# Patient Record
Sex: FEMALE | NOT HISPANIC OR LATINO | URBAN - METROPOLITAN AREA
[De-identification: names, ages, dates, MRNs, and addresses within clinical notes are randomized per-mention and may not be internally consistent; named-entity substitution may affect disease eponyms.]

---

## 2022-05-10 ENCOUNTER — TRANSFERRED RECORDS (OUTPATIENT)
Dept: HEALTH INFORMATION MANAGEMENT | Facility: CLINIC | Age: 52
End: 2022-05-10
Payer: COMMERCIAL

## 2022-05-16 ENCOUNTER — MEDICAL CORRESPONDENCE (OUTPATIENT)
Dept: HEALTH INFORMATION MANAGEMENT | Facility: CLINIC | Age: 52
End: 2022-05-16
Payer: COMMERCIAL

## 2022-05-16 ENCOUNTER — TRANSFERRED RECORDS (OUTPATIENT)
Dept: HEALTH INFORMATION MANAGEMENT | Facility: CLINIC | Age: 52
End: 2022-05-16
Payer: COMMERCIAL

## 2022-05-18 ENCOUNTER — TRANSCRIBE ORDERS (OUTPATIENT)
Dept: OTHER | Age: 52
End: 2022-05-18
Payer: COMMERCIAL

## 2022-05-18 DIAGNOSIS — R22.0 LOCALIZED SWELLING, MASS AND LUMP, HEAD: ICD-10-CM

## 2022-05-18 DIAGNOSIS — K11.5 SIALOLITHIASIS: Primary | ICD-10-CM

## 2022-05-25 ENCOUNTER — TELEPHONE (OUTPATIENT)
Dept: OTOLARYNGOLOGY | Facility: CLINIC | Age: 52
End: 2022-05-25
Payer: COMMERCIAL

## 2022-05-25 NOTE — TELEPHONE ENCOUNTER
[facility] Call Center    Phone Message    May a detailed message be left on voicemail: no     Reason for Call: Appointment Intake    Referring Provider Name: CARLOS EDUARDO RECIO  Diagnosis and/or Symptoms: K11.5 (ICD-10-CM) - Sialolithiasis  R22.0 (ICD-10-CM) - Localized swelling, mass and lump, head      Sending to clinic for review due to mass.      Action Taken: Other: ENT    Travel Screening: Not Applicable

## 2022-05-26 NOTE — TELEPHONE ENCOUNTER
Patient wants a call back the morning of Friday 5/27 to schedule New appt with Dr. Childs.      Appt Note- Ref by Aubrey Dailey for Sialolithiasis, Localized Swelling, Mass and Lump, Head

## 2022-06-10 NOTE — TELEPHONE ENCOUNTER
FUTURE VISIT INFORMATION      FUTURE VISIT INFORMATION:    Date: 8/2/22    Time: 1PM    [place] [facility] [place] INFORMATION:    Referring provider:  Aubrey Dailey    Referring providers clinic:  [facility] ENT    Reason for visit/diagnosis  Sialolithiasis, Localized swelling, mass and lump, head    RECORDS REQUESTED FROM:       Clinic name Comments Records Status Imaging Status   [facility] ENT 5/16/22 note from Dr Dailey Scanned in EPIC    [facility] Family Medicine   3/14/22 note from Linwood Littlejohn III, MD   Care everywhere    [facility] Speech Therapy   10/8/19 note from Tammy Rodas M.S. CCC-SLP   Care everywhere     [facility]   [address] [place] [address] [phone]  5/10/22 MRI NECK   5/10/22 CT Posterior Fossa Ear   5/14/21 MRI Head   7/8/19 US Thyroid   Care everywhere  Req 6/29/22 - PACS                 6/29/22 11:24AM sent a fax to [facility] for images - [facility]ay   7/6/22 3:26PM images received in PACS - Amay

## 2022-08-02 ENCOUNTER — TELEPHONE (OUTPATIENT)
Dept: OTOLARYNGOLOGY | Facility: CLINIC | Age: 52
End: 2022-08-02

## 2022-08-02 ENCOUNTER — PRE VISIT (OUTPATIENT)
Dept: OTOLARYNGOLOGY | Facility: CLINIC | Age: 52
End: 2022-08-02
Payer: COMMERCIAL

## 2022-08-02 ENCOUNTER — OFFICE VISIT (OUTPATIENT)
Dept: OTOLARYNGOLOGY | Facility: CLINIC | Age: 52
End: 2022-08-02
Payer: COMMERCIAL

## 2022-08-02 VITALS
TEMPERATURE: 97.1 F | BODY MASS INDEX: 28.88 KG/M2 | HEART RATE: 56 BPM | HEIGHT: 71 IN | SYSTOLIC BLOOD PRESSURE: 115 MMHG | DIASTOLIC BLOOD PRESSURE: 76 MMHG | WEIGHT: 206.3 LBS

## 2022-08-02 DIAGNOSIS — R22.0 FACIAL SWELLING: Primary | ICD-10-CM

## 2022-08-02 PROCEDURE — 99203 OFFICE O/P NEW LOW 30 MIN: CPT | Performed by: OTOLARYNGOLOGY

## 2022-08-02 ASSESSMENT — PAIN SCALES - GENERAL: PAINLEVEL: NO PAIN (1)

## 2022-08-02 NOTE — PATIENT INSTRUCTIONS
"You were seen in the clinic today by Dr. Childs. If you have any questions or concerns after your appointment, please call the clinic at [phone]. Press \"1\" for scheduling, press \"3\" for nurse advice.    2.   The following has been recommended for you based upon your appointment today:   -Please obtain CT scan    3.   Plan to return the clinic after results.       Maribel Don LPN  [facility]  Department of Otolaryngology  [phone]       If you have a (cerebrospinal fluid) CSF leak, you should know:  You may be at risk for an infection called pneumococcal meningitis.  Shots (vaccinations) can help prevent this infection.  When someone does get the infection, early treatment can help.    What is pneumococcal meningitis?  Meningitis is an infection of the fluid around the brain. It's very dangerous and can cause brain damage and even death. There are 2 kinds of meningitis, viral and bacterial. Pneumococcal meningitis is a bacterial infection. We have vaccines to help protect against it.    Please note: the meningococcal vaccine often given to teens and college students does not protect against pneumococcal meningitis.    Why should I worry about it?  People who have a CSF leak have more risk for this infection than other people.    What should I do?  If you have a CSF leak, talk with your primary care provider about the vaccines and make sure your shots are up to date.  If you have these symptoms, see a doctor immediately:  Fever and chills  Stiff neck together with a headache  Sick stomach or throwing up (nausea or vomiting)    When should I get the vaccines?  Your age and shot history affect which vaccine you should get and when. Talk to your provider. The vaccine guidelines that we follow are from the Centers for Disease Control (CDC).     Vaccine guidelines for adults with CSF leak:  If you have never had a pneumococcal vaccine, you should get the PCV20 (Hjqwjya93) or PCV15 (Vaxneuvance).   If you get " the PCV20, you do not need any other vaccines.   If you get the PCV15, you will need a second vaccine (the Pneumovax 23) at least 8 weeks later.   If you have previously received a dose of the Pneumovax 23:   You should get 1 dose of the PCV20 (Kcyqbtv83) or PCV15 (Vaxneuvance) at least 1 year after receiving the Pneumovax 23.   If you have previously received a dose of the Esxvjfp00:   You should get 1 dose of the Rqmdeafem96 at least 8 weeks after receiving the Mqahsch01.   If you have previously received both the Ajmzrrorn34 and Eyikpls76:   You do not need any other vaccines. Together, Jduhzbo55 and Jomdxnnbs61 meet the meningitis vaccine guidelines for CSF leak.       To learn more:  Talk to your provider. You can also visit these CDC websites:  https://www.cdc.gov/vaccines/vpd/pneumo/public/index.html  https://www.cdc.gov/vaccines/vpd/pneumo/hcp/mva-mdzf-ot-vaccinate.html

## 2022-08-02 NOTE — PROGRESS NOTES
HISTORY OF PRESENT ILLNESS: Crystal De La Cruz is a 52 year old female with a history of    Last 2 Scores for Patient-Answered VHI Questionnaire  No flowsheet data found.    Last 2 Scores for Patient-Answered CSI Questionnaire  No flowsheet data found.      Last 2 Scores for Patient-Answered EAT Questionnaire  No flowsheet data found.    August 2, 2022      Aubrey Dailey M.D.  [facility]  [address] [address]      Dear Dr Dailey,    Thank you for the opportunity to see Ms. Crystal De La Cruz today in clinic.    HISTORY OF PRESENT ILLNESS:  Ms. Crystal De La Cruz is 52 years of age.  She is self-referred, but saw one of the otolaryngology groups in [place].  She has a series a separate problems that are somewhat unusual and somewhat concerning as well.  She has had some problems with her left ear with left ear pain and left ear like plugging-type sensation a bit, but she was noted to have left ear drainage and it was beta-2 transferrin positive.  She has had no other ear history or craniofacial trauma history or anything else of this nature.  Next, she will have pain along the ear canal on the left side that may be more anteriorly.  She also was noted to have a very localized parotitis and MRI scan shows her to have accessory parotid tissue on the left side as well.      PHYSICAL EXAMINATION:  We examined her today and in examining her completely, the ear canal looked completely clear, but she had TMJ pain.  This was elucidated when I felt inside her ear canal on the left side.  Her face was otherwise symmetric.  Oral cavity and oropharynx is clear.  Nasal cavity was clear as well.  Neck was without any mass or adenopathy.    ASSESSMENT AND PLAN:  The patient with a history three separate unusual issues with the beta-2 transferrin positive fluid in her ear.  I am going to have to send her to the [facility].  She already had posterior fossa CT scan.   She also has to get a parotid scan.  The parotid MRI scan shows accessory tissue.  She may very well have a stone or some other kind of unusual formation of the glandular tissue on that side because the duct is dilated on the left side.  We will want to get a parotid CT scan on her as well.  This should probably be done at a [place] facility like [facility] or something like that.   She might need to have a sialoendoscopy or something similar to a lot this.  We will see her again after CT scan is done of the parotid gland.  She could have a phone visit with her in the next five weeks or so, or when she is back next to see Dr. Merly Sanchez, she could see me too.    Thank you for allowing me to participate in the care of this patient. If you have any further questions, please do not hesitate to contact me.      Sincerely,      Jose Childs M.D., Ph.D., FACS   Director, Research & Clinical Studies      Otolaryngology-Head and Neck Surgery    [facility]         PAST MEDICAL HISTORY: No past medical history on file.    PAST SURGICAL HISTORY: No past surgical history on file.    FAMILY HISTORY: No family history on file.    SOCIAL HISTORY:   Social History     Tobacco Use     Smoking status: Not on file     Smokeless tobacco: Not on file   Substance Use Topics     Alcohol use: Not on file       REVIEW OF SYSTEMS: Ten point review of systems was performed and is negative except for: No flowsheet data found.     ALLERGIES: Patient has no known allergies.    MEDICATIONS:   No current outpatient medications on file.         PHYSICAL EXAMINATION:  She  is awake, alert and in no apparent distress.    Her tympanic membranes are clear and intact bilaterally. External auditory canals are clear.  Nasal exam shows a mild septal deviation without obstruction.  Examination of the oral cavity shows no suspicious lesions.  There is symmetric movement of the tongue and soft palate.    The  oropharynx is clear.  Her neck is supple without significant adenopathy.  Pulse is regular.  Upper airway is clear.  Cranial nerves II-XII are grossly intact.      IMPRESSION/PLAN:

## 2022-08-02 NOTE — LETTER
8/2/2022       RE: Crystal De La Cruz  [address]  [facility] [address]     Dear Colleague,    Thank you for referring your patient, Crystal De La Cruz, to the [facility] EAR NOSE AND THROAT CLINIC [place] at [facility]. Please see a copy of my visit note below.        August 2, 2022      Aubrey Dailey M.D.  [facility]  [address], [place]  [address]      Dear Dr Dailey,    Thank you for the opportunity to see Ms. Crystal De La Cruz today in clinic.    HISTORY OF PRESENT ILLNESS:  Ms. Crystal De La Cruz is 52 years of age.  She is self-referred, but saw one of the otolaryngology groups in [place].  She has a series a separate problems that are somewhat unusual and somewhat concerning as well.  She has had some problems with her left ear with left ear pain and left ear like plugging-type sensation a bit, but she was noted to have left ear drainage and it was beta-2 transferrin positive.  She has had no other ear history or craniofacial trauma history or anything else of this nature.  Next, she will have pain along the ear canal on the left side that may be more anteriorly.  She also was noted to have a very localized parotitis and MRI scan shows her to have accessory parotid tissue on the left side as well.      PHYSICAL EXAMINATION:  We examined her today and in examining her completely, the ear canal looked completely clear, but she had TMJ pain.  This was elucidated when I felt inside her ear canal on the left side.  Her face was otherwise symmetric.  Oral cavity and oropharynx is clear.  Nasal cavity was clear as well.  Neck was without any mass or adenopathy.    ASSESSMENT AND PLAN:  The patient with a history three separate unusual issues with the beta-2 transferrin positive fluid in her ear.  I am going to have to send her to the [facility].  She already had posterior fossa CT scan.  She also has  to get a parotid scan.  The parotid MRI scan shows accessory tissue.  She may very well have a stone or some other kind of unusual formation of the glandular tissue on that side because the duct is dilated on the left side.  We will want to get a parotid CT scan on her as well.  This should probably be done at a [place] facility like [facility] or something like that.   She might need to have a sialoendoscopy or something similar to a lot this.  We will see her again after CT scan is done of the parotid gland.  She could have a phone visit with her in the next five weeks or so, or when she is back next to see Dr. Merly Sanchez, she could see me too.    Thank you for allowing me to participate in the care of this patient. If you have any further questions, please do not hesitate to contact me.      Sincerely,      Jose Childs M.D., Ph.D., FACS   Director, Research & Clinical Studies      Otolaryngology-Head and Neck Surgery    [facility]         PAST MEDICAL HISTORY: No past medical history on file.    PAST SURGICAL HISTORY: No past surgical history on file.    FAMILY HISTORY: No family history on file.    SOCIAL HISTORY:   Social History     Tobacco Use     Smoking status: Not on file     Smokeless tobacco: Not on file   Substance Use Topics     Alcohol use: Not on file       REVIEW OF SYSTEMS: Ten point review of systems was performed and is negative except for: No flowsheet data found.     ALLERGIES: Patient has no known allergies.    MEDICATIONS:   No current outpatient medications on file.         PHYSICAL EXAMINATION:  She  is awake, alert and in no apparent distress.    Her tympanic membranes are clear and intact bilaterally. External auditory canals are clear.  Nasal exam shows a mild septal deviation without obstruction.  Examination of the oral cavity shows no suspicious lesions.  There is symmetric movement of the tongue and soft palate.    The oropharynx  is clear.  Her neck is supple without significant adenopathy.  Pulse is regular.  Upper airway is clear.  Cranial nerves II-XII are grossly intact.      IMPRESSION/PLAN:          Again, thank you for allowing me to participate in the care of your patient.      Sincerely,    Jose Childs MD

## 2022-08-16 ENCOUNTER — TELEPHONE (OUTPATIENT)
Dept: NEUROSURGERY | Facility: CLINIC | Age: 52
End: 2022-08-16

## 2022-08-16 NOTE — TELEPHONE ENCOUNTER
Called patient to discuss referral to skull base clinic.    Dr. Sanchez reviewed patient's imaging and does not see any skull base defect or fluid in the mastoid to indicate encephalocele or CSF leak. It's possible that the beta 2 transferrin was a false positive.     Recommend that patient follow up with Dr. Dailey who was following her for the left sided otorrhea, and also complete the CT scan and follow up with Dr. Childs as planned.     Discussed no need for skull base clinic visit at this time. Pt is agreeable, and asks for help with scheduling CT and follow up with Dr. Childs. Message routed to clinic schedulers to assist her with this.

## 2022-08-30 ENCOUNTER — ANCILLARY PROCEDURE (OUTPATIENT)
Dept: CT IMAGING | Facility: CLINIC | Age: 52
End: 2022-08-30
Attending: OTOLARYNGOLOGY
Payer: COMMERCIAL

## 2022-08-30 ENCOUNTER — OFFICE VISIT (OUTPATIENT)
Dept: OTOLARYNGOLOGY | Facility: CLINIC | Age: 52
End: 2022-08-30
Payer: COMMERCIAL

## 2022-08-30 VITALS — DIASTOLIC BLOOD PRESSURE: 81 MMHG | HEART RATE: 62 BPM | SYSTOLIC BLOOD PRESSURE: 138 MMHG

## 2022-08-30 DIAGNOSIS — R22.0 FACIAL SWELLING: ICD-10-CM

## 2022-08-30 DIAGNOSIS — K11.20 SIALADENITIS: Primary | ICD-10-CM

## 2022-08-30 PROCEDURE — 99213 OFFICE O/P EST LOW 20 MIN: CPT | Performed by: OTOLARYNGOLOGY

## 2022-08-30 PROCEDURE — 70491 CT SOFT TISSUE NECK W/DYE: CPT | Performed by: RADIOLOGY

## 2022-08-30 RX ORDER — VALACYCLOVIR HYDROCHLORIDE 1 G/1
1 TABLET, FILM COATED ORAL
COMMUNITY
Start: 2021-10-27

## 2022-08-30 RX ORDER — MULTIVITAMIN WITH IRON
1 TABLET ORAL DAILY
COMMUNITY

## 2022-08-30 RX ORDER — MULTIPLE VITAMINS W/ MINERALS TAB 9MG-400MCG
1 TAB ORAL DAILY
COMMUNITY

## 2022-08-30 RX ORDER — LEVOCETIRIZINE DIHYDROCHLORIDE 5 MG/1
5 TABLET, FILM COATED ORAL EVERY EVENING
COMMUNITY

## 2022-08-30 RX ORDER — UBIDECARENONE 100 MG
100 CAPSULE ORAL DAILY
COMMUNITY

## 2022-08-30 RX ORDER — IOPAMIDOL 755 MG/ML
100 INJECTION, SOLUTION INTRAVASCULAR ONCE
Status: COMPLETED | OUTPATIENT
Start: 2022-08-30 | End: 2022-08-30

## 2022-08-30 RX ORDER — DULOXETIN HYDROCHLORIDE 30 MG/1
60 CAPSULE, DELAYED RELEASE ORAL
COMMUNITY
Start: 2022-03-09 | End: 2023-03-09

## 2022-08-30 RX ORDER — ATORVASTATIN CALCIUM 20 MG/1
20 TABLET, FILM COATED ORAL
COMMUNITY
Start: 2021-10-27 | End: 2022-10-27

## 2022-08-30 RX ORDER — ASPIRIN 81 MG/1
81 TABLET, CHEWABLE ORAL DAILY
COMMUNITY

## 2022-08-30 RX ORDER — PREGABALIN 50 MG/1
50 CAPSULE ORAL
COMMUNITY
Start: 2022-01-05

## 2022-08-30 RX ADMIN — IOPAMIDOL 100 ML: 755 INJECTION, SOLUTION INTRAVASCULAR at 14:44

## 2022-08-30 NOTE — LETTER
Date:August 31, 2022      Patient was self referred, no letter generated. Do not send.        [facility] [place] Health Information

## 2022-08-30 NOTE — LETTER
2022       RE: Crystal De La Cruz  [address]  [facility] [address]     Dear Colleague,    Thank you for referring your patient, Crystal De La Cruz, to the [facility] EAR NOSE AND THROAT CLINIC [place] [facility]. Please see a copy of my visit note below.      Otolaryngology Clinic    Name: Crystal De La Cruz  MRN: [id number]  Age: 52 year old  : 2022      Chief Complaint:   Follow up    History of Present Illness:   Crystal De La Cruz is a 52 year old female with a history of sialothiasis who presents for follow up. At our visit on 22, the patient reported left ear pain, plugging, and drainage, which was negative for beta-2 transferrin. She was also enduring parotitis at this time. A CT scan of the parotid was ordered, and she was referred to our Skull Base colleagues.     Today, she reports intermittent left sided facial pressure behind the ear and below cheekbone. She states that she can live with this, but most importantly wants to know what is causing it. Of note, the beta-2 transferrin was initially a false positive.    Review of Systems:   Pertinent items are noted in HPI or as in patient entered ROS below, remainder of complete ROS is negative.   No flowsheet data found.     Physical Exam:   /81 (BP Location: Right arm, Patient Position: Sitting, Cuff Size: Adult Large)   Pulse 62      PHYSICAL EXAMINATION:    Constitutional:  The patient was unaccompanied, well-groomed, and in no acute distress.    Skin:  Warm and pink.    Neurologic:  Alert and oriented x 3.  CN's III-XII within normal limits.  Voice normal.   Psychiatric:  The patient's affect was calm, cooperative, and appropriate.    Respiratory:  Breathing comfortably without stridor or exertion of accessory muscles.    Eyes: Extraocular movement intact.    Head:  Normocephalic and atraumatic.  No lesions or scars.    Ears:  Pinnae and tragus non-tender.  EAC's and  TM's were clear.   Nose:  Sinuses were non-tender.  Anterior rhinoscopy revealed midline septum and absence of purulence or polyps.    OC/OP:  Normal tongue, floor of mouth, buccal mucosa, and palate.  No lesions or masses on inspection or palpation.  No abnormal lymph tissue in the oropharynx.  The pterygoid region is non-tender. Fullness along coronoid process of the mandible.   Neck:  Supple with normal laryngeal and tracheal landmarks.  The parotid beds were without masses.  No palpable thyroid.  Lymphatic:  There is no palpable lymphadenopathy in the neck.     Assessment and Plan:  Crystal De La Cruz is a 52 year old female with a history of sialothiasis who presents for follow up. If the CT scan done today is negative, we will proceed with a sialendoscopy. I briefly explained the scope procedure. We will contact her with the CT results. We will follow up in after CT results.     Scribe Disclosure:  I, Cassie Grubbs, am serving as a scribe to document services personally performed by Jose Childs MD at this visit, based upon the provider's statements to me. All documentation has been reviewed by the aforementioned provider prior to being entered into the official medical record.       Again, thank you for allowing me to participate in the care of your patient.      Sincerely,    Jose Childs MD

## 2022-08-30 NOTE — NURSING NOTE
Chief Complaint   Patient presents with     RECHECK     Follow up with CT same day       Blood pressure 138/81, pulse 62.    Melly Hill, EMT

## 2022-08-30 NOTE — PROGRESS NOTES
Otolaryngology Clinic    Name: Crystal De La Cruz  MRN: [id number]  Age: 52 year old  : [birth year]22      Chief Complaint:   Follow up    History of Present Illness:   Crystal De La Cruz is a 52 year old female with a history of sialothiasis who presents for follow up. At our visit on 22, the patient reported left ear pain, plugging, and drainage, which was negative for beta-2 transferrin. She was also enduring parotitis at this time. A CT scan of the parotid was ordered, and she was referred to our Skull [facility] colleagues.     Today, she reports intermittent left sided facial pressure behind the ear and below cheekbone. She states that she can live with this, but most importantly wants to know what is causing it. Of note, the beta-2 transferrin was initially a false positive.    Review of Systems:   Pertinent items are noted in HPI or as in patient entered ROS below, remainder of complete ROS is negative.   No flowsheet data found.     Physical Exam:   /81 (BP Location: Right arm, Patient Position: Sitting, Cuff Size: Adult Large)   Pulse 62      PHYSICAL EXAMINATION:    Constitutional:  The patient was unaccompanied, well-groomed, and in no acute distress.    Skin:  Warm and pink.    Neurologic:  Alert and oriented x 3.  CN's III-XII within normal limits.  Voice normal.   Psychiatric:  The patient's affect was calm, cooperative, and appropriate.    Respiratory:  Breathing comfortably without stridor or exertion of accessory muscles.    Eyes: Extraocular movement intact.    Head:  Normocephalic and atraumatic.  No lesions or scars.    Ears:  Pinnae and tragus non-tender.  EAC's and TM's were clear.   Nose:  Sinuses were non-tender.  Anterior rhinoscopy revealed midline septum and absence of purulence or polyps.    OC/OP:  Normal tongue, floor of mouth, buccal mucosa, and palate.  No lesions or masses on inspection or palpation.  No abnormal lymph tissue in the oropharynx.  The pterygoid region is  non-tender. Fullness along coronoid process of the mandible.   Neck:  Supple with normal laryngeal and tracheal landmarks.  The parotid beds were without masses.  No palpable thyroid.  Lymphatic:  There is no palpable lymphadenopathy in the neck.     Assessment and Plan:  Crystal De La Cruz is a 52 year old female with a history of sialothiasis who presents for follow up. If the CT scan done today is negative, we will proceed with a sialendoscopy. I briefly explained the scope procedure. We will contact her with the CT results. We will follow up in after CT results.     Scribe Disclosure:  I, Cassie Grubbs, am serving as a scribe to document services personally performed by Jose Childs MD at this visit, based upon the provider's statements to me. All documentation has been reviewed by the aforementioned provider prior to being entered into the official medical record.

## 2022-09-20 ENCOUNTER — VIRTUAL VISIT (OUTPATIENT)
Dept: OTOLARYNGOLOGY | Facility: CLINIC | Age: 52
End: 2022-09-20
Payer: COMMERCIAL

## 2022-09-20 VITALS — HEIGHT: 71 IN | BODY MASS INDEX: 28.56 KG/M2 | WEIGHT: 204 LBS

## 2022-09-20 DIAGNOSIS — K11.20 SIALADENITIS: Primary | ICD-10-CM

## 2022-09-20 PROCEDURE — 99212 OFFICE O/P EST SF 10 MIN: CPT | Mod: 95 | Performed by: OTOLARYNGOLOGY

## 2022-09-20 RX ORDER — DULOXETIN HYDROCHLORIDE 30 MG/1
30 CAPSULE, DELAYED RELEASE ORAL
COMMUNITY
Start: 2022-09-07 | End: 2022-12-06

## 2022-09-20 ASSESSMENT — PAIN SCALES - GENERAL: PAINLEVEL: NO PAIN (0)

## 2022-09-20 NOTE — PROGRESS NOTES
Crystal De La Cruz is here for followup.  She was questioning whether or not she has a CSF leak.  This seems to be following drainage from her ear that.  She had a scan which shows dilatation of the left [place]'s duct.  She is here for followup today.  She is on a phone visit.  She has no particular complaints at the present time today and is otherwise getting by reasonably well.  It seems like she does get intermittent swelling in the left side of the face, lips and as such.  We go ahead and I think about potentially doing a sialoendoscopy on her.  We talked about the benefits and risks of this and we will see her again over the course of the next couple of days with a phone call and then she will get a sialendoscopy.

## 2022-09-20 NOTE — LETTER
Date:October 12, 2022      Patient was self referred, no letter generated. Do not send.        [facility] Health Information

## 2022-09-20 NOTE — LETTER
9/20/2022       RE: Crystal De La Cruz  [address]  [facility] [address]     Dear Colleague,    Thank you for referring your patient, Crystal De La Cruz, to the [facility] EAR NOSE AND THROAT CLINIC [place] [facility]. Please see a copy of my visit note below.    Crystal De La Cruz is here for followup.  She was questioning whether or not she has a CSF leak.  This seems to be following drainage from her ear that.  She had a scan which shows dilatation of the left [place]'s duct.  She is here for followup today.  She is on a phone visit.  She has no particular complaints at the present time today and is otherwise getting by reasonably well.  It seems like she does get intermittent swelling in the left side of the face, lips and as such.  We go ahead and I think about potentially doing a sialoendoscopy on her.  We talked about the benefits and risks of this and we will see her again over the course of the next couple of days with a phone call and then she will get a sialendoscopy.          Again, thank you for allowing me to participate in the care of your patient.      Sincerely,    Jose Childs MD

## 2022-09-22 ENCOUNTER — TELEPHONE (OUTPATIENT)
Dept: OTOLARYNGOLOGY | Facility: CLINIC | Age: 52
End: 2022-09-22

## 2022-09-22 NOTE — TELEPHONE ENCOUNTER
Called patient to discuss previous appointment with Dr. Childs. Patient would like to move forward with sialendoscopy. Will update Dr. Childs with plan and work on getting her scheduled for procedure.

## 2022-09-27 ENCOUNTER — PREP FOR PROCEDURE (OUTPATIENT)
Dept: OTOLARYNGOLOGY | Facility: CLINIC | Age: 52
End: 2022-09-27

## 2022-09-27 DIAGNOSIS — K11.20 SIALADENITIS: Primary | ICD-10-CM

## 2022-10-03 ENCOUNTER — HEALTH MAINTENANCE LETTER (OUTPATIENT)
Age: 52
End: 2022-10-03

## 2022-10-06 ENCOUNTER — MYC MEDICAL ADVICE (OUTPATIENT)
Dept: OTOLARYNGOLOGY | Facility: CLINIC | Age: 52
End: 2022-10-06

## 2022-10-06 ENCOUNTER — TELEPHONE (OUTPATIENT)
Dept: OTOLARYNGOLOGY | Facility: CLINIC | Age: 52
End: 2022-10-06

## 2022-10-06 PROBLEM — K11.20 SIALADENITIS: Status: ACTIVE | Noted: 2022-10-06

## 2022-10-06 NOTE — TELEPHONE ENCOUNTER
Called patient to schedule surgery with Dr. Childs    Date of Surgery: 12/5    Location of surgery: [facility]    Pre-Op H&P: PCP    Pre/Post Imaging:  Not Applicable    Discussed COVID-19 Testing: Yes    Post-Op Appt Date: 1-2 weeks    Surgery Packet Mailed: 10/6      Additional comments: JEANA Santa on 10/6/2022 at 9:57 AM

## 2022-11-29 NOTE — TELEPHONE ENCOUNTER
Before Your Procedure or Hospital Admission on 12/5  Testing for COVID-19  Thank you for choosing [facility] for your health care needs. Our goal is to keep you and our team here at [facility] safe and healthy. We've taken many steps to make this happen. For example:    We test and screen our staff, care teams and patients for COVID-19.    Everyone at [facility] must wear a mask.    We are limiting hospital and clinic visitors.  Before you come in, you must get tested for COVID-19, even if you've been vaccinated.   What kind of COVID-19 test should I have?  At-home rapid antigen test  1. You must have a rapid-antigen test 1 to 2 days before your procedure (surgery) if you are:  ? Over age 2 and  ? Planning to go home the same day as your procedure (surgery)  2. Rapid antigen tests are not recommended for children age 2 and under. These patients should schedule a PCR test unless you have otherwise discussed performing an at-home antigen test with your surgeon.  3. You can buy this test at many pharmacy stores. Or, you may order a free test at covid.gov/tests.  4. If your test is negative: please take a photo of the test. Show the nurse the photo when you come in for your procedure. We can't accept rapid antigen tests that are more than 2 days old.  PCR lab test   1. You must have a PCR test in a lab within 4 days of your procedure (surgery) if you are:  ? Age 2 or under (unless your surgeon gives you different instructions).  ? Planning to stay overnight in the hospital  ? Unable to find an at-home test.  2. While you don't have to use our lab, we recommend it. You can get your results more quickly and easily this way. To schedule a test at an [facility] lab, please call [phone]. Or, visit [email or web address].  3. If you have your test somewhere else, ask the testing location to fax your results to [phone].  ? If we don't get your results within 4 days of your  procedure (surgery), we may have to delay or cancel your treatment.  ? We can't accept PCR tests that are more than 4 days old.  If your test shows you have COVID-19  If your test is positive, tell your surgeon's office right away. A positive test means that you have COVID-19.  We'll probably have to postpone your admission, surgery or procedure. Your care team will discuss this with you. We'll let you know what to do and when you can reschedule.  If your test shows you DON'T have COVID-19  A negative test result means you don't have COVID-19, but it is still possible that you might get it. It's rare, but sometimes the test result is wrong. Or, you could catch the virus after taking the test. There is also a very small chance that you could catch COVID-19 in the hospital or surgery center. [facility] has taken many steps to prevent this from happening.   To reduce your risk of getting COVID-19 before your procedure (surgery), follow the precautions below.  COVID-19 precautions  After your test and before your procedure, please follow these safety guidelines:    Limit trips outside your home.    Limit the number of people you see.    Always wear a face covering outside your home.    Use social distancing. Stay 6 feet away from others whenever you can.    Wash your hands often.  Possible surgery delay   Like you, we want your surgery to happen when it's scheduled. But sometimes the hospital is so full that it's not safe for you to have your surgery. This is especially true during the pandemic. Your surgery may need to be rescheduled to a later date. If this happens, we will call and tell you.   Day of your surgery or procedure  1. Please come wearing a face covering that covers both your nose and mouth.  2. When you arrive, we'll ask you some questions to find out if you've had any exposures to COVID-19 or have any signs of COVID-19.  3. Ask your care team if you can have visitors. All visitors must wear face  "coverings and will be screened for exposure to, or signs of, COVID-19.  ? The rules for visitors change often, depending on how much the virus is spreading. To learn more, see \"Visiting a Loved One in the Hospital during the COVID-19 Outbreak,\" at: www.[employer]/929252.pdf.   Please call your care team, hospital or surgery center if you have any questions. We thank you for your understanding and for choosing [facility] for your care.     For informational purposes only. Not to replace the advice of your health care provider. Copyright   2020 [employer]. All rights reserved. Clinically reviewed by Infection Prevention and the [facility] COVID-19 Clinical Team. [employer] [id number] - Rev 22.    Preparing for Your Surgery  Getting started  A nurse will call you to review your health history and instructions. They will give you an arrival time based on your scheduled surgery time. Please be ready to share:    Your doctor's clinic name and phone number    Your medical, surgical and anesthesia history    A list of allergies and sensitivities    A list of medicines, including herbal treatments and over-the-counter drugs    Whether the patient has a legal guardian (ask how to send us the papers in advance)  Please tell us if you're pregnant--or if there's any chance you might be pregnant. Some surgeries may injure a fetus (unborn baby), so they require a pregnancy test. Surgeries that are safe for a fetus don't always need a test, and you can choose whether to have one.   If you have a child who's having surgery, please ask for a copy of Preparing for Your Child's Surgery.    Preparing for surgery  1. Within 10 to 30 days of surgery: Have a pre-op exam (sometimes called an H&P, or History and Physical). This can be done at a clinic or pre-operative center.  ? If you're having a , you may not need this exam. Talk to your care team.  2. At your pre-op exam, talk to your care team " about all medicines you take. If you need to stop any medicines before surgery, ask when to start taking them again.  ? We do this for your safety. Many medicines can make you bleed too much during surgery. Some change how well surgery (anesthesia) drugs work.  3. Call your insurance company to let them know you're having surgery. (If you don't have insurance, call [phone].)  4. Call your clinic if there's any change in your health. This includes signs of a cold or flu (sore throat, runny nose, cough, rash, fever). It also includes a scrape or scratch near the surgery site.  5. If you have questions on the day of surgery, call your hospital or surgery center.  COVID testing  You may need to be tested for COVID-19 before having surgery. If so, we will give you instructions (or click here).  Eating and drinking guidelines  For your safety: Unless your surgeon tells you otherwise, follow the guidelines below.    Eat and drink as usual until 8 hours before surgery. After that, no food or milk.    Drink clear liquids until 2 hours before surgery. These are liquids you can see through, like water, Gatorade and Propel Water. You may also have black coffee and tea (no cream or milk).    Nothing by mouth within 2 hours of surgery. This includes gum, candy and breath mints.    If you drink alcohol: Stop drinking it the night before surgery.    If your care team tells you to take medicine on the morning of surgery, it's okay to take it with a sip of water.  Preventing infection  1. Shower or bathe the night before and morning of your surgery. Follow the instructions your clinic gave you. (If no instructions, use regular soap.)  2. Don't shave or clip hair near your surgery site. We'll remove the hair if needed.  3. Don't smoke or vape the morning of surgery. You may chew nicotine gum up to 2 hours before surgery. A nicotine patch is okay.  ? Note: Some surgeries require you to completely quit smoking and nicotine. Check  with your surgeon.  4. Your care team will make every effort to keep you safe from infection. We will:  ? Clean our hands often with soap and water (or an alcohol-based hand rub).  ? Clean the skin at your surgery site with a special soap that kills germs.  ? Give you a special gown to keep you warm. (Cold raises the risk of infection.)  ? Wear special hair covers, masks, gowns and gloves during surgery.  ? Give antibiotic medicine, if prescribed. Not all surgeries need antibiotics.  What to bring on the day of surgery  1. Photo ID and insurance card  2. Copy of your health care directive, if you have one  3. Glasses and hearing aides (bring cases)  ? You can't wear contacts during surgery  4. Inhaler and eye drops, if you use them (tell us about these when you arrive)  5. CPAP machine or breathing device, if you use them  6. A few personal items, if spending the night  7. If you have . . .  ? A pacemaker, ICD (cardiac defibrillator) or other implant: Bring the ID card.  ? An implanted stimulator: Bring the remote control.  ? A legal guardian: Bring a copy of the certified (court-stamped) guardianship papers.  Please remove any jewelry, including body piercings. Leave jewelry and other valuables at home.  If you're going home the day of surgery    You must have a responsible adult drive you home. They should stay with you overnight as well.    If you don't have someone to stay with you, and you aren't safe to go home alone, we may keep you overnight. Insurance often won't pay for this.  After surgery  If it's hard to control your pain or you need more pain medicine, please call your surgeon's office.  Questions?   If you have any questions for your care team, list them here:     For informational purposes only. Not to replace the advice of your health care provider. Copyright   2003, 2019 [employer]. All rights reserved. Clinically reviewed by Lelia Stevens MD. [employer] [id number] - REV 07/22.    Showering  Before Surgery  Your surgeon has asked you to take 2 showers before surgery.  Why is this important?  It is normal for bacteria (germs) to be on your skin. The skin protects us from these germs. When you have surgery, we cut the skin. Sometimes germs get into the cuts and cause infection (illness caused by germs). By following the instructions below and using special soap, you will lower the number of germs on your skin. This decreases your chance of infection.  Special soap  Buy or get 8 ounces of antiseptic surgical soap called 4% CHG. Common name brands of this soap are Hibiclens and Exidine.  You can find it at your local pharmacy, clinic or retail store. If you have trouble, ask your pharmacist to help you find the right substitute.  A note about shaving:  Do not shave within 12 inches of your incision (surgical cut) area for at least 3 days before surgery. Shaving can make small cuts in the skin. This puts you at a higher risk of infection.  Items you will need for each shower:    1 newly washed towel    4 ounces of one of the above soaps    Clean pajamas or clothes to change into  Follow these instructions:  Follow these steps the evening before surgery and the morning of surgery.  1. Wash your hair and body with your regular shampoo and soap. Make sure you rinse the shampoo and soap from your hair and body.  2. Using clean hands, apply about 2 ounces of soap gently on your skin from your ear lobes to your toes. Use on your groin area last. Do not use this soap on your face or head. If you get any soap in your eyes, ears or mouth, rinse right away.  3. Repeat step 2. It is very important to let the soap stay on your skin for at least 1 minute.  4. Rinse well and dry off using a clean towel.  If you feel any tingling, itching or other irritation, rinse right away. It is normal to feel some coolness on the skin after using the antiseptic soap. Your skin may feel a bit dry after the shower, but do not use any  lotions, creams or moisturizers. Do not use hair spray or other products in your hair.  5. Dress in freshly washed clothes or pajamas. Use fresh pillowcases and sheets on your bed.  Repeat these steps the morning of surgery.  If you have any questions about showering or an allergy to CHG soap, please call your surgery center.  For informational purposes only. Not to replace the advice of your health care provider. Copyright   2012 [facility]. All rights reserved. Clinically reviewed by Infection Prevention and Practice and Education. [employer] [id number] - REV 12/20.

## 2022-12-02 ENCOUNTER — ANESTHESIA EVENT (OUTPATIENT)
Dept: SURGERY | Facility: AMBULATORY SURGERY CENTER | Age: 52
End: 2022-12-02
Payer: COMMERCIAL

## 2022-12-05 ENCOUNTER — HOSPITAL ENCOUNTER (OUTPATIENT)
Facility: AMBULATORY SURGERY CENTER | Age: 52
Discharge: HOME | End: 2022-12-05
Attending: OTOLARYNGOLOGY
Payer: COMMERCIAL

## 2022-12-05 ENCOUNTER — ANESTHESIA (OUTPATIENT)
Dept: SURGERY | Facility: AMBULATORY SURGERY CENTER | Age: 52
End: 2022-12-05
Payer: COMMERCIAL

## 2022-12-05 VITALS
WEIGHT: 198 LBS | HEART RATE: 85 BPM | BODY MASS INDEX: 27.72 KG/M2 | HEIGHT: 71 IN | TEMPERATURE: 97.5 F | DIASTOLIC BLOOD PRESSURE: 55 MMHG | SYSTOLIC BLOOD PRESSURE: 111 MMHG | OXYGEN SATURATION: 97 % | RESPIRATION RATE: 16 BRPM

## 2022-12-05 DIAGNOSIS — K11.20 SIALADENITIS: ICD-10-CM

## 2022-12-05 PROCEDURE — 42699 UNLISTED PX SALIVRY GLND/DUX: CPT

## 2022-12-05 PROCEDURE — 42699 UNLISTED PX SALIVRY GLND/DUX: CPT | Mod: GC | Performed by: OTOLARYNGOLOGY

## 2022-12-05 RX ORDER — MEPERIDINE HYDROCHLORIDE 25 MG/ML
12.5 INJECTION INTRAMUSCULAR; INTRAVENOUS; SUBCUTANEOUS
Status: DISCONTINUED | OUTPATIENT
Start: 2022-12-05 | End: 2022-12-06 | Stop reason: HOSPADM

## 2022-12-05 RX ORDER — ONDANSETRON 2 MG/ML
INJECTION INTRAMUSCULAR; INTRAVENOUS PRN
Status: DISCONTINUED | OUTPATIENT
Start: 2022-12-05 | End: 2022-12-05

## 2022-12-05 RX ORDER — KETOROLAC TROMETHAMINE 30 MG/ML
INJECTION, SOLUTION INTRAMUSCULAR; INTRAVENOUS PRN
Status: DISCONTINUED | OUTPATIENT
Start: 2022-12-05 | End: 2022-12-05

## 2022-12-05 RX ORDER — CHLORHEXIDINE GLUCONATE ORAL RINSE 1.2 MG/ML
SOLUTION DENTAL PRN
Status: DISCONTINUED | OUTPATIENT
Start: 2022-12-05 | End: 2022-12-05 | Stop reason: HOSPADM

## 2022-12-05 RX ORDER — SODIUM CHLORIDE, SODIUM LACTATE, POTASSIUM CHLORIDE, CALCIUM CHLORIDE 600; 310; 30; 20 MG/100ML; MG/100ML; MG/100ML; MG/100ML
INJECTION, SOLUTION INTRAVENOUS CONTINUOUS
Status: DISCONTINUED | OUTPATIENT
Start: 2022-12-05 | End: 2022-12-06 | Stop reason: HOSPADM

## 2022-12-05 RX ORDER — FENTANYL CITRATE 50 UG/ML
25 INJECTION, SOLUTION INTRAMUSCULAR; INTRAVENOUS EVERY 5 MIN PRN
Status: DISCONTINUED | OUTPATIENT
Start: 2022-12-05 | End: 2022-12-05 | Stop reason: HOSPADM

## 2022-12-05 RX ORDER — ACETAMINOPHEN 325 MG/1
975 TABLET ORAL ONCE
Status: COMPLETED | OUTPATIENT
Start: 2022-12-05 | End: 2022-12-05

## 2022-12-05 RX ORDER — EPHEDRINE SULFATE 50 MG/ML
INJECTION, SOLUTION INTRAMUSCULAR; INTRAVENOUS; SUBCUTANEOUS PRN
Status: DISCONTINUED | OUTPATIENT
Start: 2022-12-05 | End: 2022-12-05

## 2022-12-05 RX ORDER — FENTANYL CITRATE 50 UG/ML
50 INJECTION, SOLUTION INTRAMUSCULAR; INTRAVENOUS EVERY 5 MIN PRN
Status: DISCONTINUED | OUTPATIENT
Start: 2022-12-05 | End: 2022-12-05 | Stop reason: HOSPADM

## 2022-12-05 RX ORDER — ONDANSETRON 4 MG/1
4 TABLET, ORALLY DISINTEGRATING ORAL EVERY 30 MIN PRN
Status: DISCONTINUED | OUTPATIENT
Start: 2022-12-05 | End: 2022-12-06 | Stop reason: HOSPADM

## 2022-12-05 RX ORDER — GABAPENTIN 300 MG/1
300 CAPSULE ORAL
Status: COMPLETED | OUTPATIENT
Start: 2022-12-05 | End: 2022-12-05

## 2022-12-05 RX ORDER — HYDROMORPHONE HYDROCHLORIDE 1 MG/ML
0.2 INJECTION, SOLUTION INTRAMUSCULAR; INTRAVENOUS; SUBCUTANEOUS EVERY 5 MIN PRN
Status: DISCONTINUED | OUTPATIENT
Start: 2022-12-05 | End: 2022-12-05 | Stop reason: HOSPADM

## 2022-12-05 RX ORDER — ONDANSETRON 2 MG/ML
4 INJECTION INTRAMUSCULAR; INTRAVENOUS EVERY 30 MIN PRN
Status: DISCONTINUED | OUTPATIENT
Start: 2022-12-05 | End: 2022-12-06 | Stop reason: HOSPADM

## 2022-12-05 RX ORDER — LIDOCAINE 40 MG/G
CREAM TOPICAL
Status: DISCONTINUED | OUTPATIENT
Start: 2022-12-05 | End: 2022-12-05 | Stop reason: HOSPADM

## 2022-12-05 RX ORDER — LIDOCAINE HYDROCHLORIDE 20 MG/ML
INJECTION, SOLUTION INFILTRATION; PERINEURAL PRN
Status: DISCONTINUED | OUTPATIENT
Start: 2022-12-05 | End: 2022-12-05

## 2022-12-05 RX ORDER — FENTANYL CITRATE 50 UG/ML
INJECTION, SOLUTION INTRAMUSCULAR; INTRAVENOUS PRN
Status: DISCONTINUED | OUTPATIENT
Start: 2022-12-05 | End: 2022-12-05

## 2022-12-05 RX ORDER — SODIUM CHLORIDE, SODIUM LACTATE, POTASSIUM CHLORIDE, CALCIUM CHLORIDE 600; 310; 30; 20 MG/100ML; MG/100ML; MG/100ML; MG/100ML
INJECTION, SOLUTION INTRAVENOUS CONTINUOUS
Status: DISCONTINUED | OUTPATIENT
Start: 2022-12-05 | End: 2022-12-05 | Stop reason: HOSPADM

## 2022-12-05 RX ORDER — PROPOFOL 10 MG/ML
INJECTION, EMULSION INTRAVENOUS PRN
Status: DISCONTINUED | OUTPATIENT
Start: 2022-12-05 | End: 2022-12-05

## 2022-12-05 RX ORDER — HYDROMORPHONE HYDROCHLORIDE 1 MG/ML
0.4 INJECTION, SOLUTION INTRAMUSCULAR; INTRAVENOUS; SUBCUTANEOUS EVERY 5 MIN PRN
Status: DISCONTINUED | OUTPATIENT
Start: 2022-12-05 | End: 2022-12-05 | Stop reason: HOSPADM

## 2022-12-05 RX ORDER — DEXAMETHASONE SODIUM PHOSPHATE 4 MG/ML
INJECTION, SOLUTION INTRA-ARTICULAR; INTRALESIONAL; INTRAMUSCULAR; INTRAVENOUS; SOFT TISSUE PRN
Status: DISCONTINUED | OUTPATIENT
Start: 2022-12-05 | End: 2022-12-05

## 2022-12-05 RX ORDER — FENTANYL CITRATE 50 UG/ML
25 INJECTION, SOLUTION INTRAMUSCULAR; INTRAVENOUS
Status: DISCONTINUED | OUTPATIENT
Start: 2022-12-05 | End: 2022-12-06 | Stop reason: HOSPADM

## 2022-12-05 RX ORDER — PROPOFOL 10 MG/ML
INJECTION, EMULSION INTRAVENOUS CONTINUOUS PRN
Status: DISCONTINUED | OUTPATIENT
Start: 2022-12-05 | End: 2022-12-05

## 2022-12-05 RX ADMIN — LIDOCAINE HYDROCHLORIDE 100 MG: 20 INJECTION, SOLUTION INFILTRATION; PERINEURAL at 07:34

## 2022-12-05 RX ADMIN — GABAPENTIN 300 MG: 300 CAPSULE ORAL at 06:45

## 2022-12-05 RX ADMIN — ACETAMINOPHEN 975 MG: 325 TABLET ORAL at 06:44

## 2022-12-05 RX ADMIN — PROPOFOL 150 MG: 10 INJECTION, EMULSION INTRAVENOUS at 07:34

## 2022-12-05 RX ADMIN — Medication 40 MG: at 07:34

## 2022-12-05 RX ADMIN — ONDANSETRON 4 MG: 2 INJECTION INTRAMUSCULAR; INTRAVENOUS at 07:34

## 2022-12-05 RX ADMIN — SODIUM CHLORIDE, SODIUM LACTATE, POTASSIUM CHLORIDE, CALCIUM CHLORIDE: 600; 310; 30; 20 INJECTION, SOLUTION INTRAVENOUS at 06:50

## 2022-12-05 RX ADMIN — EPHEDRINE SULFATE 5 MG: 50 INJECTION, SOLUTION INTRAMUSCULAR; INTRAVENOUS; SUBCUTANEOUS at 08:09

## 2022-12-05 RX ADMIN — EPHEDRINE SULFATE 5 MG: 50 INJECTION, SOLUTION INTRAMUSCULAR; INTRAVENOUS; SUBCUTANEOUS at 07:59

## 2022-12-05 RX ADMIN — FENTANYL CITRATE 100 MCG: 50 INJECTION, SOLUTION INTRAMUSCULAR; INTRAVENOUS at 07:34

## 2022-12-05 RX ADMIN — DEXAMETHASONE SODIUM PHOSPHATE 4 MG: 4 INJECTION, SOLUTION INTRA-ARTICULAR; INTRALESIONAL; INTRAMUSCULAR; INTRAVENOUS; SOFT TISSUE at 07:34

## 2022-12-05 RX ADMIN — EPHEDRINE SULFATE 5 MG: 50 INJECTION, SOLUTION INTRAMUSCULAR; INTRAVENOUS; SUBCUTANEOUS at 08:19

## 2022-12-05 RX ADMIN — PROPOFOL 200 MCG/KG/MIN: 10 INJECTION, EMULSION INTRAVENOUS at 07:34

## 2022-12-05 RX ADMIN — KETOROLAC TROMETHAMINE 15 MG: 30 INJECTION, SOLUTION INTRAMUSCULAR; INTRAVENOUS at 08:25

## 2022-12-05 NOTE — ANESTHESIA PREPROCEDURE EVALUATION
Anesthesia Pre-Procedure Evaluation    Patient: Crystal De La Cruz   MRN: [id number] : 1970        Procedure : Procedure(s):  REMOVAL, CALCULUS, SALIVARY GLAND, ENDOSCOPIC          No past medical history on file.   History reviewed. No pertinent surgical history.   Allergies   Allergen Reactions     Cephalexin Unknown      Social History     Tobacco Use     Smoking status: Not on file     Smokeless tobacco: Not on file   Substance Use Topics     Alcohol use: Not on file      Wt Readings from Last 1 Encounters:   22 89.8 kg (198 lb)        Anesthesia Evaluation   Pt has had prior anesthetic. Type: General.        ROS/MED HX  ENT/Pulmonary:  - neg pulmonary ROS     Neurologic:     (+) migraines,     Cardiovascular:  - neg cardiovascular ROS     METS/Exercise Tolerance: >4 METS    Hematologic:  - neg hematologic  ROS     Musculoskeletal:  - neg musculoskeletal ROS     GI/Hepatic:  - neg GI/hepatic ROS     Renal/Genitourinary:  - neg Renal ROS     Endo:  - neg endo ROS     Psychiatric/Substance Use:     (+) psychiatric history depression     Infectious Disease:  - neg infectious disease ROS     Malignancy:       Other:            Physical Exam    Airway        Mallampati: II   TM distance: > 3 FB    Mouth opening: > 3 cm    Respiratory Devices and Support         Dental       (+) missing and chipped      Cardiovascular   cardiovascular exam normal          Pulmonary   pulmonary exam normal                OUTSIDE LABS:  CBC: No results found for: WBC, HGB, HCT, PLT  BMP: No results found for: NA, POTASSIUM, CHLORIDE, CO2, BUN, CR, GLC  COAGS: No results found for: PTT, INR, FIBR  POC: No results found for: BGM, HCG, HCGS  HEPATIC: No results found for: ALBUMIN, PROTTOTAL, ALT, AST, GGT, ALKPHOS, BILITOTAL, BILIDIRECT, MARIE  OTHER: No results found for: PH, LACT, A1C, DINO, PHOS, MAG, LIPASE, AMYLASE, TSH, T4, T3, CRP, SED    Anesthesia Plan    ASA Status:  2   NPO Status:  NPO Appropriate    Anesthesia Type:  General.     - Airway: ETT   Induction: Intravenous.   Maintenance: TIVA.        Consents            Postoperative Care    Pain management: IV analgesics, Multi-modal analgesia.   PONV prophylaxis: Ondansetron (or other 5HT-3), Background Propofol Infusion     Comments:                Sabino Schaffer DO

## 2022-12-05 NOTE — ANESTHESIA CARE TRANSFER NOTE
Patient: Crystal De La Cruz    Procedure: Procedure(s):  Left parotid sialoendoscopy       Diagnosis: Sialadenitis [K11.20]  Diagnosis Additional Information: No value filed.    Anesthesia Type:   General     Note:    Oropharynx: oropharynx clear of all foreign objects and spontaneously breathing  Level of Consciousness: awake  Oxygen Supplementation: face mask  Level of Supplemental Oxygen (L/min / FiO2): 5  Independent Airway: airway patency satisfactory and stable  Dentition: dentition unchanged  Vital Signs Stable: post-procedure vital signs reviewed and stable  Report to RN Given: handoff report given  Patient transferred to: PACU    Handoff Report: Identifed the Patient, Identified the Reponsible Provider, Reviewed the pertinent medical history, Discussed the surgical course, Reviewed Intra-OP anesthesia mangement and issues during anesthesia, Set expectations for post-procedure period and Allowed opportunity for questions and acknowledgement of understanding      Vitals:  Vitals Value Taken Time   /65 12/05/22 0846   Temp 98.1    Pulse 81 12/05/22 0849   Resp 18 12/05/22 0849   SpO2 94 % 12/05/22 0849   Vitals shown include unvalidated device data.    Electronically Signed By: MARÍA PAUL  December 5, 2022  8:50 AM

## 2022-12-05 NOTE — BRIEF OP NOTE
[facility]    Brief Operative Note    Pre-operative diagnosis: Sialadenitis [K11.20]  Post-operative diagnosis Same as pre-operative diagnosis    Procedure: Procedure(s):  Left parotid sialoendoscopy  Surgeon: Surgeon(s) and Role:     * Jose Childs MD - Primary     * Keeley Lozoya MD - Resident - Assisting  Anesthesia: General   Estimated Blood Loss: None    Drains: None  Specimens: * No specimens in log *  Findings:   Left stensen duct dilate to #4 dilator. Duct irrigated with 10 ml normal saline  Complications: None.  Implants: * No implants in log *    Keeley Lozoya MD   ENT Resident

## 2022-12-05 NOTE — DISCHARGE INSTRUCTIONS
[facility]  Home Care Following Anesthesia  For 24 hours after surgery:  Get plenty of rest.  A responsible adult must stay with you for at least 24 hours after you leave the surgery center.  Do not drive or use heavy equipment.  If you have weakness or tingling, don't drive or use heavy equipment until this feeling goes away.   Do not drink alcohol.   Avoid strenuous or risky activities.  Ask for help when climbing stairs.  You may feel lightheaded.  IF so, sit for a few minutes before standing.  Have someone help you get up.   If you have nausea (feel sick to your stomach): Drink only clear liquids such as apple juice, ginger ale, broth or 7-Up.  Rest may also help.  Be sure to drink enough fluids.  Move to a regular diet as you feel able.   You may have a slight fever.  Call the doctor if your fever is over 100 F (37.7 C) (taken under the tongue) or lasts longer than 24 hours.  You may have a dry mouth, a sore throat, muscle aches or trouble sleeping. These should go away after 24 hours.  Do not make important or legal decisions.   It is recommended to avoid smoking.               Tips for taking pain medications  To get the best pain relief possible, remember these points:  Take pain medications as directed, before pain becomes severe.  Pain medication can upset your stomach: taking it with food may help.  Constipation is a common side effect of pain medication. Drink plenty of  fluids.  Eat foods high in fiber. Take a stool softener if recommended by your doctor or pharmacist.  Do not drink alcohol, drive or operate machinery while taking pain medications.  Ask about other ways to control pain, such as with heat, ice or relaxation.    Tylenol/Acetaminophen Consumption  To help encourage the safe use of acetaminophen, the makers of TYLENOL  have lowered the maximum daily dose for single-ingredient Extra Strength TYLENOL  (acetaminophen) products sold in the [place] from 8 pills  per day (4,000 mg) to 6 pills per day (3,000 mg). The dosing interval has also changed from 2 pills every 4-6 hours to 2 pills every 6 hours.  If you feel your pain relief is insufficient, you may take Tylenol/Acetaminophen in addition to your narcotic pain medication.   Be careful not to exceed 3,000 mg of Tylenol/Acetaminophen in a 24 hour period from all sources.  If you are taking extra strength Tylenol/acetaminophen (500 mg), the maximum dose is 6 tablets in 24 hours.  If you are taking regular strength acetaminophen (325 mg), the maximum dose is 9 tablets in 24 hours.  Tylenol 975mg given at 6:44AM.  Next dose available at 12:44PM, then follow package directions.  No NSAIDS until 2:25PM.    Call a doctor for any of the following:  Signs of infection (fever, growing tenderness at the surgery site, a large amount of drainage or bleeding, severe pain, foul-smelling drainage, redness, swelling).  It has been over 8 to 10 hours since surgery and you are still not able to urinate (pass water).  Headache for over 24 hours.  Signs of Covid-19 infection (temperature over 100 degrees, shortness of breath, cough, loss of taste/smell, generalized body aches, persistent headache, chills, sore throat, nausea/vomiting/diarrhea)  Your doctor is:       Dr. Jose Childs, ENT Otolaryngology: [phone]               Or dial [phone] and ask for the resident on call for:  ENT Otolaryngology  For emergency care, call the:  [facility]:  [phone] (TTY for hearing impaired: [phone])

## 2022-12-05 NOTE — ANESTHESIA POSTPROCEDURE EVALUATION
Patient: Crystal De La Cruz    Procedure: Procedure(s):  Left parotid sialoendoscopy       Anesthesia Type:  General    Note:  Disposition: Outpatient   Postop Pain Control: Uneventful            Sign Out: Well controlled pain   PONV:    Neuro/Psych: Uneventful            Sign Out: Acceptable/Baseline neuro status   Airway/Respiratory: Uneventful            Sign Out: Acceptable/Baseline resp. status   CV/Hemodynamics: Uneventful            Sign Out: Acceptable CV status; No obvious hypovolemia; No obvious fluid overload   Other NRE: NONE   DID A NON-ROUTINE EVENT OCCUR? No           Last vitals:  Vitals Value Taken Time   /65 12/05/22 0856   Temp 36.7  C (98  F) 12/05/22 0856   Pulse 70 12/05/22 0856   Resp 20 12/05/22 0856   SpO2 98 % 12/05/22 0858   Vitals shown include unvalidated device data.    Electronically Signed By: Sabino Schaffer DO  December 5, 2022  9:05 AM

## 2022-12-07 NOTE — OP NOTE
Procedure Date: 12/05/2022    PREOPERATIVE DIAGNOSIS:  Recurrent sialadenitis.    POSTOPERATIVE DIAGNOSIS:  Recurrent sialadenitis.    PROCEDURE:  Left parotid sialoendoscopy.    SURGEON:  Jose Childs M.D.    BLOOD LOSS:  Minimal.    COMPLICATIONS:  None.    OPERATIVE FINDINGS:  A very narrow distal Stensen's duct.    INDICATIONS FOR PROCEDURE:  This patient has been suffering from recurrent sialadenitis.  There have been some issues where the recent MRI scan shows that there is a dilated Stensen's duct, maybe even passing of a stone, but no stones were identified.  She is left with some pain and what appears to be a bit of a mass in her central parotid gland, but she has been bothered by this off and on, so we decided to go ahead and perform a sialoendoscopy  .    ANESTHESIA:  General.    PREOPERATIVE ANTIBIOTICS:  None.    RESIDENT:  Keeley Lozoya M.D.    DESCRIPTION OF PROCEDURE:  After informed consent was obtained from the patient, she was brought to the operating room and general endotracheal anesthesia was established.  The left parotid duct was examined.  We examined the left Stensen's duct.  I went ahead and went from a quadruple 0 to a #4 short dilator on the left side.  At the time we went to a short dilator, it was hard to actually put a short dilator in, and so we went ahead and used the tapered dilator to widen the duct out a fair amount at the opening into the oral cavity on the left side.  I went ahead and then used the 1.1 mm scope. We were able to get into the area of the oral cavity.  We were able to get into the duct.  We got good visualization of the duct and photographed it.  We went ahead and used 10 to 15 mL of saline to wash out the entire left side.  There were no stones.  There were no other abnormalities that we otherwise saw in this area, and we were able to withdraw the scope at the end of the case.  Because of the difficulty at the punctum of the Stensen's duct with dilation, we  went ahead and we did not do quite a sialodochoplasty, but we went ahead and we did indeed incise the opening of the duct, so that it would be wider for the flow of saliva on the left side.  At the end of the case, the patient was awakened and brought to postoperative recovery in good condition.    Jose Childs MD        D: 2022   T: 2022   MT: [id number]    Name:     ENOCH MCKEON  MRN:      [id number]        Account:        [id number]   :      1970           Procedure Date: 2022     Document: [id number]

## 2022-12-09 NOTE — PROGRESS NOTES
Otolaryngology Clinic    Name: Crystal De La Cruz  MRN: [id number]  Age: 52 year old  : 2024       15 minute phone visit   Chief Complaint:   Telephone Follow up     History of Present Illness:   Crystal De La Cruz is a 52 year old female with a history of recurrent sialadenitis s/p left parotid sialoendoscopy 22 who presents for follow up via telephone patient doing well no infectious or other sequalae after procedure last week.      Review of Systems:   Pertinent items are noted in HPI or as in patient entered ROS below, remainder of complete ROS is negative.   No flowsheet data found.     Physical Exam:   There were no vitals taken for this visit.     P   Assessment and Plan:  No diagnosis found.  Crystal De La Cruz is a 52 year old female with a history of recurrent sialadenitis s/p left parotid sialoendoscopy 22 who presents for follow up. Will have 3 month follow up     Follow-up: No follow-ups on file.         Scribe Disclosure:  I, Nakia Mata, am serving as a scribe to document services personally performed by Jose Childs MD at this visit, based upon the provider's statements to me. All documentation has been reviewed by the aforementioned provider prior to being entered into the official medical record.

## 2022-12-12 ENCOUNTER — TELEPHONE (OUTPATIENT)
Dept: OTOLARYNGOLOGY | Facility: CLINIC | Age: 52
End: 2022-12-12

## 2022-12-12 NOTE — TELEPHONE ENCOUNTER
[facility]    Phone Message    May a detailed message be left on voicemail: yes     Reason for Call: Other: Pt has post op appointment scheduled for tomorrow and she's wondering if she can just change it to a phone call to hear how surgery went or what other options there are besides coming in, please contact her to discuss further, thanks     Action Taken: Other: ENT    Travel Screening: Not Applicable

## 2022-12-13 ENCOUNTER — TELEPHONE (OUTPATIENT)
Dept: OTOLARYNGOLOGY | Facility: CLINIC | Age: 52
End: 2022-12-13

## 2022-12-13 ENCOUNTER — VIRTUAL VISIT (OUTPATIENT)
Dept: OTOLARYNGOLOGY | Facility: CLINIC | Age: 52
End: 2022-12-13
Payer: COMMERCIAL

## 2022-12-13 VITALS — HEIGHT: 71 IN | BODY MASS INDEX: 27.3 KG/M2 | WEIGHT: 195 LBS

## 2022-12-13 DIAGNOSIS — K11.20 SIALADENITIS: Primary | ICD-10-CM

## 2022-12-13 PROCEDURE — 99212 OFFICE O/P EST SF 10 MIN: CPT | Mod: 95 | Performed by: OTOLARYNGOLOGY

## 2022-12-13 ASSESSMENT — PAIN SCALES - GENERAL: PAINLEVEL: NO PAIN (0)

## 2022-12-13 NOTE — LETTER
2022       RE: Crystal De La Cruz  [address]  [facility] [address]     Dear Colleague,    Thank you for referring your patient, Crystal De La Cruz, to the [facility] EAR NOSE AND THROAT CLINIC [place] at [facility]. Please see a copy of my visit note below.      Otolaryngology Clinic    Name: Crystal De La Cruz  MRN: [id number]  Age: 52 year old  : 2024       15 minute phone visit   Chief Complaint:   Telephone Follow up     History of Present Illness:   Crystal De La Cruz is a 52 year old female with a history of recurrent sialadenitis s/p left parotid sialoendoscopy 22 who presents for follow up via telephone patient doing well no infectious or other sequalae after procedure last week.      Review of Systems:   Pertinent items are noted in HPI or as in patient entered ROS below, remainder of complete ROS is negative.   No flowsheet data found.     Physical Exam:   There were no vitals taken for this visit.     P   Assessment and Plan:  No diagnosis found.  Crystal De La Cruz is a 52 year old female with a history of recurrent sialadenitis s/p left parotid sialoendoscopy 22 who presents for follow up. Will have 3 month follow up     Follow-up: No follow-ups on file.         Scribe Disclosure:  I, Nakia Mata, am serving as a scribe to document services personally performed by Jose Childs MD at this visit, based upon the provider's statements to me. All documentation has been reviewed by the aforementioned provider prior to being entered into the official medical record.       Again, thank you for allowing me to participate in the care of your patient.      Sincerely,    Jose Childs MD

## 2022-12-13 NOTE — LETTER
Date:December 14, 2022      Patient was self referred, no letter generated. Do not send.        [facility] Health Information

## 2022-12-13 NOTE — PATIENT INSTRUCTIONS
"You were seen in the clinic today by Dr. Childs. If you have any questions or concerns after your appointment, please call the clinic at [phone]. Press \"1\" for scheduling, press \"3\" for nurse advice.    2.   The following has been recommended for you based upon your appointment today:   -make sure to keep drinking fluids    3.   Plan to return the clinic in 3 months    Lenora NGUYEN, RN  [facility]  Department of Otolaryngology  [phone]     "

## 2022-12-13 NOTE — TELEPHONE ENCOUNTER
Spoke with patient regarding scheduling a Return in about 3 months (around 3/13/2023). Scheduled patient accordingly and sent AVS Printout to confirmed address.-Per Patient

## 2023-03-14 ENCOUNTER — OFFICE VISIT (OUTPATIENT)
Dept: OTOLARYNGOLOGY | Facility: CLINIC | Age: 53
End: 2023-03-14
Payer: COMMERCIAL

## 2023-03-14 VITALS
HEIGHT: 71 IN | HEART RATE: 64 BPM | BODY MASS INDEX: 26.74 KG/M2 | SYSTOLIC BLOOD PRESSURE: 119 MMHG | DIASTOLIC BLOOD PRESSURE: 77 MMHG | WEIGHT: 191 LBS

## 2023-03-14 DIAGNOSIS — K11.20 SIALADENITIS: Primary | ICD-10-CM

## 2023-03-14 PROCEDURE — 99213 OFFICE O/P EST LOW 20 MIN: CPT | Performed by: OTOLARYNGOLOGY

## 2023-03-14 RX ORDER — METRONIDAZOLE 500 MG/1
TABLET ORAL
COMMUNITY
Start: 2023-03-10

## 2023-03-14 ASSESSMENT — PAIN SCALES - GENERAL: PAINLEVEL: NO PAIN (0)

## 2023-03-14 NOTE — LETTER
Date:March 15, 2023      Patient was self referred, no letter generated. Do not send.        [facility] Health Information

## 2023-03-14 NOTE — LETTER
"3/14/2023       RE: Crystal De La Cruz  [address] [facility] [address]     Dear Colleague,    Thank you for referring your patient, Crystal De La Cruz, to the [facility] EAR NOSE AND THROAT CLINIC [place] at [facility]. Please see a copy of my visit note below.      Otolaryngology Clinic    Name: Crystal De La Cruz  MRN: [id number]  Age: 52 year old  : 2025      Chief Complaint:   Follow up     History of Present Illness:   Crystal De La Cruz is a 52 year old female with a history of recurrent sialadenitis s/p left parotid sialoendoscopy 22 who presents for follow up. At our last visit on 22 she was doing well following surgery. Today she tells me that she has been sucking on emeka and drinking lots of water so feels her salivary glands have been doing well. She does have some popping of her jaw on the left side. She does not chew very much gum.      Review of Systems:   Pertinent items are noted in HPI or as in patient entered ROS below, remainder of complete ROS is negative.   No flowsheet data found.     Physical Exam:   /77 (BP Location: Right arm, Patient Position: Sitting, Cuff Size: Adult Large)   Pulse 64   Ht 1.803 m (5' 11\")   Wt 86.6 kg (191 lb)   BMI 26.64 kg/m       PHYSICAL EXAMINATION:    Constitutional:  The patient was unaccompanied, well-groomed, and in no acute distress.    Skin:  Warm and pink.    Neurologic:  Alert and oriented x 3.  CN's III-XII within normal limits.  Voice normal.   Psychiatric:  The patient's affect was calm, cooperative, and appropriate.    Respiratory:  Breathing comfortably without stridor or exertion of accessory muscles.    Eyes: Extraocular movement intact.    Head:  Normocephalic and atraumatic.  No lesions or scars.    OC/OP:  Normal tongue, floor of mouth, buccal mucosa, and palate.  No lesions or masses on inspection or palpation.  No abnormal lymph tissue in the " oropharynx.  The pterygoid region is non-tender.  Fair amount of saliva expelled with palpation of the gland.   Neck:  Supple with normal laryngeal and tracheal landmarks.  The parotid beds were without masses.  No palpable thyroid.  Lymphatic:  There is no palpable lymphadenopathy in the neck.      Assessment and Plan:  No diagnosis found.  Crystal De La Cruz is a 52 year old female with a history of recurrent sialadenitis s/p left parotid sialoendoscopy 12/5/22 who presents for follow up. I am glad she is doing well and I was able to press a fair amount of saliva out of her gland with pressure. She should continue hydration and lemon drops. She will follow-up via telephone visit in about 6 months.         Scribe Disclosure:  I, Nakia Mata, am serving as a scribe to document services personally performed by Jose Childs MD at this visit, based upon the provider's statements to me. All documentation has been reviewed by the aforementioned provider prior to being entered into the official medical record.            Again, thank you for allowing me to participate in the care of your patient.      Sincerely,    Jose Childs MD

## 2023-03-14 NOTE — PROGRESS NOTES
"  Otolaryngology Clinic    Name: Crystal De La Cruz  MRN: [id number]  Age: 52 year old  : 2025      Chief Complaint:   Follow up     History of Present Illness:   Crystal De La Cruz is a 52 year old female with a history of recurrent sialadenitis s/p left parotid sialoendoscopy 22 who presents for follow up. At our last visit on 22 she was doing well following surgery. Today she tells me that she has been sucking on emeka and drinking lots of water so feels her salivary glands have been doing well. She does have some popping of her jaw on the left side. She does not chew very much gum.      Review of Systems:   Pertinent items are noted in HPI or as in patient entered ROS below, remainder of complete ROS is negative.   No flowsheet data found.     Physical Exam:   /77 (BP Location: Right arm, Patient Position: Sitting, Cuff Size: Adult Large)   Pulse 64   Ht 1.803 m (5' 11\")   Wt 86.6 kg (191 lb)   BMI 26.64 kg/m       PHYSICAL EXAMINATION:    Constitutional:  The patient was unaccompanied, well-groomed, and in no acute distress.    Skin:  Warm and pink.    Neurologic:  Alert and oriented x 3.  CN's III-XII within normal limits.  Voice normal.   Psychiatric:  The patient's affect was calm, cooperative, and appropriate.    Respiratory:  Breathing comfortably without stridor or exertion of accessory muscles.    Eyes: Extraocular movement intact.    Head:  Normocephalic and atraumatic.  No lesions or scars.    OC/OP:  Normal tongue, floor of mouth, buccal mucosa, and palate.  No lesions or masses on inspection or palpation.  No abnormal lymph tissue in the oropharynx.  The pterygoid region is non-tender.  Fair amount of saliva expelled with palpation of the gland.   Neck:  Supple with normal laryngeal and tracheal landmarks.  The parotid beds were without masses.  No palpable thyroid.  Lymphatic:  There is no palpable lymphadenopathy in the neck.      Assessment and Plan:  No " diagnosis found.  Crystal De La Cruz is a 52 year old female with a history of recurrent sialadenitis s/p left parotid sialoendoscopy 12/5/22 who presents for follow up. I am glad she is doing well and I was able to press a fair amount of saliva out of her gland with pressure. She should continue hydration and lemon drops. She will follow-up via telephone visit in about 6 months.         Scribe Disclosure:  I, Nakia Mata, am serving as a scribe to document services personally performed by Jose Childs MD at this visit, based upon the provider's statements to me. All documentation has been reviewed by the aforementioned provider prior to being entered into the official medical record.

## 2023-03-14 NOTE — PATIENT INSTRUCTIONS
"You were seen in the clinic today by Dr. Childs. If you have any questions or concerns after your appointment, please call the clinic at [phone]. Press \"1\" for scheduling, press \"3\" for nurse advice.    2.   The following has been recommended for you based upon your appointment today:   -Plan to return the clinic in 6 months using a phone visit.    Lenora NGUYEN, RN  [facility]  Department of Otolaryngology  [phone]      "

## 2023-03-14 NOTE — NURSING NOTE
"No chief complaint on file.      Blood pressure 119/77, pulse 64, height 1.803 m (5' 11\"), weight 86.6 kg (191 lb).    Melly Hill, EMT    "

## 2023-09-07 ENCOUNTER — PRE VISIT (OUTPATIENT)
Dept: OTOLARYNGOLOGY | Facility: CLINIC | Age: 53
End: 2023-09-07
Payer: COMMERCIAL

## 2023-09-07 NOTE — TELEPHONE ENCOUNTER
Reason for visit: 6 month sialadenitis follow-up      Relevant information: LOV: 3/14/2023 s/p left parotid sialoendoscopy 12/5/22.  POC: Continue hydration and lemon drops, follow-up via phone visit in 6 months    Records/imaging/labs/orders: n/a    Pt called: n/a      Raina Cook LPN  9/7/2023  2:50 PM

## 2023-09-12 ENCOUNTER — VIRTUAL VISIT (OUTPATIENT)
Dept: OTOLARYNGOLOGY | Facility: CLINIC | Age: 53
End: 2023-09-12
Payer: COMMERCIAL

## 2023-09-12 DIAGNOSIS — K11.20 SIALADENITIS: Primary | ICD-10-CM

## 2023-09-12 PROCEDURE — 99212 OFFICE O/P EST SF 10 MIN: CPT | Mod: 93 | Performed by: OTOLARYNGOLOGY

## 2023-09-12 NOTE — PROGRESS NOTES
We spoke with Lenora De La Cruz in clinic today we were on the phone with her from 230 until 240 as well as examination of her chart she is here for 6-month follow-up she has greatly diminished symptoms but not completely absent symptoms of the right parotid gland with swelling however now when she gets swelling she will get swelling that will last for a small period time and that able to be decompressed and she has no pain she got infections she feels now that she will call us again if there are problems on an as-needed basis but she feels that she is fairly stable at the present time she is status post her sialendoscopy about 9 months now

## 2023-09-12 NOTE — LETTER
9/12/2023       RE: Crystal De La Cruz  [address] [place] [facility] [address]     Dear Colleague,    Thank you for referring your patient, Crystal De La Cruz, to the [facility] EAR NOSE AND THROAT CLINIC [place] at [facility]. Please see a copy of my visit note below.    We spoke with Lenora De La Cruz in clinic today we were on the phone with her from 230 until 240 as well as examination of her chart she is here for 6-month follow-up she has greatly diminished symptoms but not completely absent symptoms of the right parotid gland with swelling however now when she gets swelling she will get swelling that will last for a small period time and that able to be decompressed and she has no pain she got infections she feels now that she will call us again if there are problems on an as-needed basis but she feels that she is fairly stable at the present time she is status post her sialendoscopy about 9 months now      Again, thank you for allowing me to participate in the care of your patient.      Sincerely,    Jose Childs MD

## 2023-09-12 NOTE — PATIENT INSTRUCTIONS
"You were seen in the clinic today by Dr. Childs. If you have any questions or concerns after your appointment, please call the clinic at [phone]. Press \"1\" for scheduling, press \"3\" for nurse advice.    2.   Plan to return to the clinic in in 1 year     Lenora NGUYEN, RN  [facility]  Department of Otolaryngology  [phone]     "

## 2023-12-31 ENCOUNTER — HEALTH MAINTENANCE LETTER (OUTPATIENT)
Age: 53
End: 2023-12-31

## 2024-07-17 ENCOUNTER — TELEPHONE (OUTPATIENT)
Dept: OTOLARYNGOLOGY | Facility: CLINIC | Age: 54
End: 2024-07-17
Payer: COMMERCIAL

## 2025-01-19 ENCOUNTER — HEALTH MAINTENANCE LETTER (OUTPATIENT)
Age: 55
End: 2025-01-19

## 2025-03-16 ENCOUNTER — HEALTH MAINTENANCE LETTER (OUTPATIENT)
Age: 55
End: 2025-03-16

## (undated) DEVICE — SUCTION MANIFOLD NEPTUNE 2 SYS 1 PORT 702-025-000

## (undated) DEVICE — RAD KNIFE HANDLE W/11 BLADE DISPOSABLE 371611

## (undated) DEVICE — SYR 10ML FINGER CONTROL W/O NDL 309695

## (undated) DEVICE — SOL NACL 0.9% IRRIG 500ML BOTTLE 2F7123

## (undated) DEVICE — SYR 50ML LL W/O NDL 309653

## (undated) DEVICE — LINEN TOWEL PACK X5 5464

## (undated) DEVICE — TUBING IV EXTENSION SET 60" HI FLOW 2N3349

## (undated) DEVICE — GLOVE PROTEXIS POWDER FREE SMT 8.0  2D72PT80X

## (undated) DEVICE — PUMP SYSTEM SINGLE ACTION M0067201000

## (undated) DEVICE — NDL 25GA 2"  8881200441

## (undated) DEVICE — SPECIMEN CONTAINER URINE 90ML STERILE 75.1435.002

## (undated) DEVICE — TOOTHBRUSH ADULT NON STERILE MDS136850

## (undated) DEVICE — PACK ENT ENDOSCOPY CUSTOM ASC

## (undated) RX ORDER — CEFAZOLIN SODIUM 1 G/3ML
INJECTION, POWDER, FOR SOLUTION INTRAMUSCULAR; INTRAVENOUS
Status: DISPENSED
Start: 2022-12-05

## (undated) RX ORDER — DEXAMETHASONE SODIUM PHOSPHATE 4 MG/ML
INJECTION, SOLUTION INTRA-ARTICULAR; INTRALESIONAL; INTRAMUSCULAR; INTRAVENOUS; SOFT TISSUE
Status: DISPENSED
Start: 2022-12-05

## (undated) RX ORDER — ACETAMINOPHEN 325 MG/1
TABLET ORAL
Status: DISPENSED
Start: 2022-12-05

## (undated) RX ORDER — GABAPENTIN 300 MG/1
CAPSULE ORAL
Status: DISPENSED
Start: 2022-12-05

## (undated) RX ORDER — FENTANYL CITRATE 50 UG/ML
INJECTION, SOLUTION INTRAMUSCULAR; INTRAVENOUS
Status: DISPENSED
Start: 2022-12-05

## (undated) RX ORDER — CHLORHEXIDINE GLUCONATE ORAL RINSE 1.2 MG/ML
SOLUTION DENTAL
Status: DISPENSED
Start: 2022-12-05

## (undated) RX ORDER — ONDANSETRON 2 MG/ML
INJECTION INTRAMUSCULAR; INTRAVENOUS
Status: DISPENSED
Start: 2022-12-05

## (undated) RX ORDER — KETOROLAC TROMETHAMINE 30 MG/ML
INJECTION, SOLUTION INTRAMUSCULAR; INTRAVENOUS
Status: DISPENSED
Start: 2022-12-05

## (undated) RX ORDER — EPHEDRINE SULFATE 50 MG/ML
INJECTION, SOLUTION INTRAMUSCULAR; INTRAVENOUS; SUBCUTANEOUS
Status: DISPENSED
Start: 2022-12-05

## (undated) RX ORDER — PROPOFOL 10 MG/ML
INJECTION, EMULSION INTRAVENOUS
Status: DISPENSED
Start: 2022-12-05

## (undated) RX ORDER — LIDOCAINE HYDROCHLORIDE 10 MG/ML
INJECTION, SOLUTION EPIDURAL; INFILTRATION; INTRACAUDAL; PERINEURAL
Status: DISPENSED
Start: 2022-12-05

## (undated) RX ORDER — EPINEPHRINE 1 MG/ML
INJECTION, SOLUTION INTRAMUSCULAR; SUBCUTANEOUS
Status: DISPENSED
Start: 2022-12-05